# Patient Record
Sex: MALE | Race: ASIAN | Employment: STUDENT | URBAN - METROPOLITAN AREA
[De-identification: names, ages, dates, MRNs, and addresses within clinical notes are randomized per-mention and may not be internally consistent; named-entity substitution may affect disease eponyms.]

---

## 2020-01-28 ENCOUNTER — HOSPITAL ENCOUNTER (EMERGENCY)
Facility: CLINIC | Age: 20
Discharge: HOME | End: 2020-01-28
Attending: EMERGENCY MEDICINE | Admitting: EMERGENCY MEDICINE
Payer: COMMERCIAL

## 2020-01-28 ENCOUNTER — APPOINTMENT (OUTPATIENT)
Dept: GENERAL RADIOLOGY | Facility: CLINIC | Age: 20
End: 2020-01-28
Attending: EMERGENCY MEDICINE
Payer: COMMERCIAL

## 2020-01-28 VITALS
WEIGHT: 165.34 LBS | OXYGEN SATURATION: 100 % | SYSTOLIC BLOOD PRESSURE: 125 MMHG | RESPIRATION RATE: 25 BRPM | BODY MASS INDEX: 23.67 KG/M2 | DIASTOLIC BLOOD PRESSURE: 77 MMHG | HEIGHT: 70 IN | HEART RATE: 74 BPM | TEMPERATURE: 99.7 F

## 2020-01-28 DIAGNOSIS — J06.9 VIRAL UPPER RESPIRATORY TRACT INFECTION: ICD-10-CM

## 2020-01-28 LAB
ALBUMIN SERPL-MCNC: 4.7 G/DL (ref 3.4–5)
ALBUMIN UR-MCNC: NEGATIVE MG/DL
ALP SERPL-CCNC: 69 U/L (ref 40–150)
ALT SERPL W P-5'-P-CCNC: 15 U/L (ref 0–70)
ANION GAP SERPL CALCULATED.3IONS-SCNC: 3 MMOL/L (ref 3–14)
APPEARANCE UR: CLEAR
AST SERPL W P-5'-P-CCNC: 7 U/L (ref 0–45)
BASOPHILS # BLD AUTO: 0 10E9/L (ref 0–0.2)
BASOPHILS NFR BLD AUTO: 0.5 %
BILIRUB SERPL-MCNC: 1.3 MG/DL (ref 0.2–1.3)
BILIRUB UR QL STRIP: NEGATIVE
BUN SERPL-MCNC: 8 MG/DL (ref 7–30)
C PNEUM DNA SPEC QL NAA+PROBE: NOT DETECTED
CALCIUM SERPL-MCNC: 9 MG/DL (ref 8.5–10.1)
CHLORIDE SERPL-SCNC: 106 MMOL/L (ref 94–109)
CO2 SERPL-SCNC: 28 MMOL/L (ref 20–32)
COLOR UR AUTO: YELLOW
CREAT SERPL-MCNC: 0.7 MG/DL (ref 0.66–1.25)
DIFFERENTIAL METHOD BLD: NORMAL
EOSINOPHIL # BLD AUTO: 0 10E9/L (ref 0–0.7)
EOSINOPHIL NFR BLD AUTO: 0.1 %
ERYTHROCYTE [DISTWIDTH] IN BLOOD BY AUTOMATED COUNT: 11.9 % (ref 10–15)
FLUAV H1 2009 PAND RNA SPEC QL NAA+PROBE: NOT DETECTED
FLUAV H1 RNA SPEC QL NAA+PROBE: NOT DETECTED
FLUAV H3 RNA SPEC QL NAA+PROBE: NOT DETECTED
FLUAV RNA SPEC QL NAA+PROBE: NOT DETECTED
FLUAV+FLUBV AG SPEC QL: NEGATIVE
FLUAV+FLUBV AG SPEC QL: NEGATIVE
FLUBV RNA SPEC QL NAA+PROBE: NOT DETECTED
GFR SERPL CREATININE-BSD FRML MDRD: >90 ML/MIN/{1.73_M2}
GLUCOSE SERPL-MCNC: 101 MG/DL (ref 70–99)
GLUCOSE UR STRIP-MCNC: NEGATIVE MG/DL
HADV DNA SPEC QL NAA+PROBE: NOT DETECTED
HCOV PNL SPEC NAA+PROBE: NOT DETECTED
HCT VFR BLD AUTO: 49.2 % (ref 40–53)
HGB BLD-MCNC: 17.2 G/DL (ref 13.3–17.7)
HGB UR QL STRIP: NEGATIVE
HMPV RNA SPEC QL NAA+PROBE: NOT DETECTED
HPIV1 RNA SPEC QL NAA+PROBE: NOT DETECTED
HPIV2 RNA SPEC QL NAA+PROBE: NOT DETECTED
HPIV3 RNA SPEC QL NAA+PROBE: NOT DETECTED
HPIV4 RNA SPEC QL NAA+PROBE: NOT DETECTED
IMM GRANULOCYTES # BLD: 0 10E9/L (ref 0–0.4)
IMM GRANULOCYTES NFR BLD: 0.4 %
KETONES UR STRIP-MCNC: 80 MG/DL
LACTATE BLD-SCNC: 1 MMOL/L (ref 0.7–2)
LEUKOCYTE ESTERASE UR QL STRIP: NEGATIVE
LYMPHOCYTES # BLD AUTO: 1.7 10E9/L (ref 0.8–5.3)
LYMPHOCYTES NFR BLD AUTO: 20.8 %
M PNEUMO DNA SPEC QL NAA+PROBE: NOT DETECTED
MCH RBC QN AUTO: 29.8 PG (ref 26.5–33)
MCHC RBC AUTO-ENTMCNC: 35 G/DL (ref 31.5–36.5)
MCV RBC AUTO: 85 FL (ref 78–100)
MICROBIOLOGIST REVIEW: NORMAL
MONOCYTES # BLD AUTO: 0.8 10E9/L (ref 0–1.3)
MONOCYTES NFR BLD AUTO: 10.1 %
NEUTROPHILS # BLD AUTO: 5.6 10E9/L (ref 1.6–8.3)
NEUTROPHILS NFR BLD AUTO: 68.1 %
NITRATE UR QL: NEGATIVE
NRBC # BLD AUTO: 0 10*3/UL
NRBC BLD AUTO-RTO: 0 /100
PH UR STRIP: 6 PH (ref 5–7)
PLATELET # BLD AUTO: 307 10E9/L (ref 150–450)
POTASSIUM SERPL-SCNC: 3.5 MMOL/L (ref 3.4–5.3)
PROT SERPL-MCNC: 8.2 G/DL (ref 6.8–8.8)
RBC # BLD AUTO: 5.78 10E12/L (ref 4.4–5.9)
RSV AG SPEC QL: NEGATIVE
RSV RNA SPEC QL NAA+PROBE: NOT DETECTED
RSV RNA SPEC QL NAA+PROBE: NOT DETECTED
RV+EV RNA SPEC QL NAA+PROBE: NOT DETECTED
SODIUM SERPL-SCNC: 138 MMOL/L (ref 133–144)
SOURCE: ABNORMAL
SP GR UR STRIP: 1.02 (ref 1–1.03)
SPECIMEN SOURCE: NORMAL
SPECIMEN SOURCE: NORMAL
UROBILINOGEN UR STRIP-MCNC: NORMAL MG/DL (ref 0–2)
WBC # BLD AUTO: 8.2 10E9/L (ref 4–11)

## 2020-01-28 PROCEDURE — 87633 RESP VIRUS 12-25 TARGETS: CPT | Performed by: EMERGENCY MEDICINE

## 2020-01-28 PROCEDURE — 85025 COMPLETE CBC W/AUTO DIFF WBC: CPT | Performed by: EMERGENCY MEDICINE

## 2020-01-28 PROCEDURE — 80053 COMPREHEN METABOLIC PANEL: CPT | Performed by: EMERGENCY MEDICINE

## 2020-01-28 PROCEDURE — 87807 RSV ASSAY W/OPTIC: CPT | Performed by: EMERGENCY MEDICINE

## 2020-01-28 PROCEDURE — 99284 EMERGENCY DEPT VISIT MOD MDM: CPT | Mod: Z6 | Performed by: EMERGENCY MEDICINE

## 2020-01-28 PROCEDURE — 25800030 ZZH RX IP 258 OP 636: Performed by: EMERGENCY MEDICINE

## 2020-01-28 PROCEDURE — 87252 VIRUS INOCULATION TISSUE: CPT | Performed by: EMERGENCY MEDICINE

## 2020-01-28 PROCEDURE — 71046 X-RAY EXAM CHEST 2 VIEWS: CPT

## 2020-01-28 PROCEDURE — 96360 HYDRATION IV INFUSION INIT: CPT | Performed by: EMERGENCY MEDICINE

## 2020-01-28 PROCEDURE — 81003 URINALYSIS AUTO W/O SCOPE: CPT | Performed by: EMERGENCY MEDICINE

## 2020-01-28 PROCEDURE — 25000132 ZZH RX MED GY IP 250 OP 250 PS 637: Performed by: EMERGENCY MEDICINE

## 2020-01-28 PROCEDURE — 87486 CHLMYD PNEUM DNA AMP PROBE: CPT | Performed by: EMERGENCY MEDICINE

## 2020-01-28 PROCEDURE — 83605 ASSAY OF LACTIC ACID: CPT | Performed by: EMERGENCY MEDICINE

## 2020-01-28 PROCEDURE — 99284 EMERGENCY DEPT VISIT MOD MDM: CPT | Mod: 25 | Performed by: EMERGENCY MEDICINE

## 2020-01-28 PROCEDURE — 87581 M.PNEUMON DNA AMP PROBE: CPT | Performed by: EMERGENCY MEDICINE

## 2020-01-28 PROCEDURE — 87804 INFLUENZA ASSAY W/OPTIC: CPT | Performed by: EMERGENCY MEDICINE

## 2020-01-28 RX ORDER — BENZONATATE 100 MG/1
100 CAPSULE ORAL ONCE
Status: COMPLETED | OUTPATIENT
Start: 2020-01-28 | End: 2020-01-28

## 2020-01-28 RX ORDER — ACETAMINOPHEN 500 MG
1000 TABLET ORAL ONCE
Status: COMPLETED | OUTPATIENT
Start: 2020-01-28 | End: 2020-01-28

## 2020-01-28 RX ADMIN — BENZONATATE 100 MG: 100 CAPSULE ORAL at 15:11

## 2020-01-28 RX ADMIN — SODIUM CHLORIDE 2000 ML: 9 INJECTION, SOLUTION INTRAVENOUS at 11:17

## 2020-01-28 RX ADMIN — ACETAMINOPHEN 1000 MG: 500 TABLET, FILM COATED ORAL at 11:24

## 2020-01-28 SDOH — HEALTH STABILITY: MENTAL HEALTH: HOW OFTEN DO YOU HAVE A DRINK CONTAINING ALCOHOL?: NEVER

## 2020-01-28 ASSESSMENT — ENCOUNTER SYMPTOMS
DIFFICULTY URINATING: 0
HEADACHES: 0
EYE REDNESS: 0
APPETITE CHANGE: 1
COLOR CHANGE: 0
COUGH: 1
FATIGUE: 1
NECK STIFFNESS: 0
CONFUSION: 0
SHORTNESS OF BREATH: 0
ABDOMINAL PAIN: 0
FEVER: 1
ARTHRALGIAS: 0

## 2020-01-28 ASSESSMENT — MIFFLIN-ST. JEOR: SCORE: 1762.5

## 2020-01-28 NOTE — ED PROVIDER NOTES
[place] EMERGENCY DEPARTMENT ([facility])  1/28/20 ED 31  10:28 AM   History     Chief Complaint   Patient presents with     Flu Symptoms     The history is provided by the patient and medical records.     Boy Carnes is a 19 year old male who presents with subjective fevers, chills, nausea and nonproductive cough that started approximately 3-4 days ago, though isn't certain exactly when symptoms began. Patient did have recent travel to [place] for approximately 20 days over winter break. He was in [facility] earlier this month. He notes that he was in open public spaces in [facility] for over a day, but not over two days. He denies being in the markets at [facility].  He left [facility] on 1/11/2020, stayed 1 day in [facility], and spent 2 days in a hotel room with its own airflow system in self-imposed quarantine. He then returned to [place] on 1/18/2020. Patient states that 3-4 days ago he developed subjective fevers. He went to [facility] on 1/25/2020 for evaluation given low-grade temperatures, travel to [facility] and concern for exposure to coronavirus.  Care Everywhere records reviewed, patient providers at [facility] did contact [facility] who noted that he was at the end of incubation.  No coronavirus swabs were done at that time. He was given a medication for fever (acetaminophen). He states he was tested for influenza and this was negative.  He also had a chest x-ray which was negative.  He was given diagnosis of anxiety, was discharged home with strict return precautions.  Patient subsequently developed a mild dry cough yesterday along with midsternal chest discomfort and so presents to the ER for evaluation.  He has poor appetite and has not been eating much. He has generalized weakness, but thinks this is from the decreased food intake. He denies any particular sick contacts. He has midsternal chest discomfort but denies any distinct chest pain. He is concerned by this midsternal chest discomfort. He has been  "sleeping ok. Patient had temperature of 99.4 F today, no high fevers. He has sore throat, nonproductive cough, congestion.  He has generalized weakness and poor appetite. No diarrhea. No shortness of breath or chest pain.  He states that the acetaminophen helped his subjective fevers temporarily but subjective fever recurred soon after this medication wore off. He has not tried any other medications since then.     I have reviewed the Medications, Allergies, Past Medical and Surgical History, and Social History in the [facility] system.  History reviewed. No pertinent past medical history.    History reviewed. No pertinent surgical history.    History reviewed. No pertinent family history.    Social History     Tobacco Use     Smoking status: Never Smoker     Smokeless tobacco: Never Used   Substance Use Topics     Alcohol use: Never     Frequency: Never   Social history: Patient is a student at the [employer] with 3 roommates.  He has been a student for the past 6 months in [place].  Review of Systems   Constitutional: Positive for appetite change, fatigue and fever ( Subjective, no temperatures recorded above 99.7).   HENT: Negative for congestion.    Eyes: Negative for redness.   Respiratory: Positive for cough. Negative for shortness of breath.    Cardiovascular: Chest pain: Midsternal chest discomfort.   Gastrointestinal: Negative for abdominal pain.   Genitourinary: Negative for difficulty urinating.   Musculoskeletal: Negative for arthralgias and neck stiffness.   Skin: Negative for color change.   Neurological: Negative for headaches.   Psychiatric/Behavioral: Negative for confusion.       Physical Exam   BP: 132/86  Pulse: 116  Heart Rate: 73  Temp: 99.7  F (37.6  C)  Resp: 18  Height: 178 cm (5' 10.08\")  Weight: 75 kg (165 lb 5.5 oz)  SpO2: 97 %      Physical Exam  Constitutional:       General: He is not in acute distress.     Appearance: He is not diaphoretic.   HENT:      Head: Atraumatic. "   Eyes:      General: No scleral icterus.     Pupils: Pupils are equal, round, and reactive to light.   Cardiovascular:      Heart sounds: Normal heart sounds.   Pulmonary:      Effort: No respiratory distress.      Breath sounds: Normal breath sounds.   Abdominal:      General: Bowel sounds are normal.      Palpations: Abdomen is soft.      Tenderness: There is no abdominal tenderness.   Musculoskeletal:         General: No tenderness.   Skin:     General: Skin is warm.      Findings: No rash.   Psychiatric:         Mood and Affect: Mood is anxious.         ED Course        Procedures  Results for orders placed or performed during the hospital encounter of 01/28/20 (from the past 24 hour(s))   CBC with platelets differential   Result Value Ref Range    WBC 8.2 4.0 - 11.0 10e9/L    RBC Count 5.78 4.4 - 5.9 10e12/L    Hemoglobin 17.2 13.3 - 17.7 g/dL    Hematocrit 49.2 40.0 - 53.0 %    MCV 85 78 - 100 fl    MCH 29.8 26.5 - 33.0 pg    MCHC 35.0 31.5 - 36.5 g/dL    RDW 11.9 10.0 - 15.0 %    Platelet Count 307 150 - 450 10e9/L    Diff Method Automated Method     % Neutrophils 68.1 %    % Lymphocytes 20.8 %    % Monocytes 10.1 %    % Eosinophils 0.1 %    % Basophils 0.5 %    % Immature Granulocytes 0.4 %    Nucleated RBCs 0 0 /100    Absolute Neutrophil 5.6 1.6 - 8.3 10e9/L    Absolute Lymphocytes 1.7 0.8 - 5.3 10e9/L    Absolute Monocytes 0.8 0.0 - 1.3 10e9/L    Absolute Eosinophils 0.0 0.0 - 0.7 10e9/L    Absolute Basophils 0.0 0.0 - 0.2 10e9/L    Abs Immature Granulocytes 0.0 0 - 0.4 10e9/L    Absolute Nucleated RBC 0.0    Comprehensive metabolic panel   Result Value Ref Range    Sodium 138 133 - 144 mmol/L    Potassium 3.5 3.4 - 5.3 mmol/L    Chloride 106 94 - 109 mmol/L    Carbon Dioxide 28 20 - 32 mmol/L    Anion Gap 3 3 - 14 mmol/L    Glucose 101 (H) 70 - 99 mg/dL    Urea Nitrogen 8 7 - 30 mg/dL    Creatinine 0.70 0.66 - 1.25 mg/dL    GFR Estimate >90 >60 mL/min/[1.73_m2]    GFR Estimate If Black >90 >60  mL/min/[1.73_m2]    Calcium 9.0 8.5 - 10.1 mg/dL    Bilirubin Total 1.3 0.2 - 1.3 mg/dL    Albumin 4.7 3.4 - 5.0 g/dL    Protein Total 8.2 6.8 - 8.8 g/dL    Alkaline Phosphatase 69 40 - 150 U/L    ALT 15 0 - 70 U/L    AST 7 0 - 45 U/L   Influenza A/B antigen   Result Value Ref Range    Influenza A/B Agn Specimen Nasopharyngeal     Influenza A Negative NEG^Negative    Influenza B Negative NEG^Negative   Lactic acid whole blood   Result Value Ref Range    Lactic Acid 1.0 0.7 - 2.0 mmol/L   RSV rapid antigen   Result Value Ref Range    RSV Rapid Antigen Spec Type Nasopharyngeal     RSV Rapid Antigen Result Negative NEG^Negative   XR Chest 2 Views    Narrative    EXAMINATION: XR CHEST 2 VW, 1/28/2020 12:17 PM    INDICATION: Cough, shortness of breath    COMPARISON: None    FINDINGS: PA and lateral upright views of the chest.     Trachea is midline. Cardiac mediastinal borders are clear. No focal  airspace opacity. No pneumothorax. No pleural effusion. Soft tissues  are grossly unremarkable. No acute osseous abnormalities.       Impression    IMPRESSION: No acute cardiac or pulmonary abnormalities.     Medications   benzonatate (TESSALON) capsule 100 mg (has no administration in time range)   0.9% sodium chloride BOLUS (0 mLs Intravenous Stopped 1/28/20 1217)   acetaminophen (TYLENOL) tablet 1,000 mg (1,000 mg Oral Given 1/28/20 1124)         Assessments & Plan (with Medical Decision Making)   19-year-old male who presents with 3 to 4-day history of cough, low-grade temperatures, mild nausea, decreased appetite.  Differential includes URI of viral etiology, pneumonia, medication side effect, other infection.  Exam revealed that he was slightly tachycardic and somewhat anxious.  Chest x-ray was negative.  CBC and comprehensive metabolic panel were normal.  Influenza and RSV swabs were negative.  The case was discussed at length with [place] [facility] as well as infection control given relatively recent visit  to wound on [place] and concern for coronavirus.  Respiratory viral panel by PCR was sent and is pending.  [facility] had been contacted by [facility] and recommended testing for coronavirus.  2 oral pharyngeal swabs and one nasopharyngeal swab were obtained and sent.  Ultimately, after contacting infection control and MD regarding patient's living situation, it was felt that patient could maintain quarantine in his current living place.  Patient was instructed to return for elevated temperatures above 101.5, other concerning symptoms.    I have reviewed the nursing notes.    I have reviewed the findings, diagnosis, plan and need for follow up with the patient.    New Prescriptions    No medications on file       Final diagnoses:   Viral upper respiratory tract infection   IPamela, am serving as a trained medical scribe to document services personally performed by Yoni Willis MD based on the provider's statements to me on January 28, 2020.  This document has been checked and approved by the attending provider.    IYoni MD, was physically present and have reviewed and verified the accuracy of this note documented by Pamela Miguel, medical scribe.       1/28/2020   [facility], [place], EMERGENCY DEPARTMENT     Yoni Willis MD  01/28/20 4603

## 2020-01-28 NOTE — ED AVS SNAPSHOT
[facility], [place], Emergency Department  [address] [id number]  Phone:  [phone]                                    Boy Carnes   MRN: [id number]    Department:  [facility], Emergency Department   Date of Visit:  1/28/2020           After Visit Summary Signature Page    I have received my discharge instructions, and my questions have been answered. I have discussed any challenges I see with this plan with the nurse or doctor.    ..........................................................................................................................................  Patient/Patient Representative Signature      ..........................................................................................................................................  Patient Representative Print Name and Relationship to Patient    ..................................................               ................................................  Date                                   Time    ..........................................................................................................................................  Reviewed by Signature/Title    ...................................................              ..............................................  Date                                               Time          22EPIC Rev 08/18

## 2020-01-28 NOTE — ED TRIAGE NOTES
Pt presents to ED with c/o cough, fever, and weakness. Symptoms started about 4 days ago. Was staying in [facility] for 1-2 days, left [facility] 17 days ago and stayed in a different city in [place]. [place] the [place] about 10 [place] ago.

## 2020-02-05 LAB
LAB SCANNED RESULT: NORMAL
LAB SCANNED RESULT: NORMAL

## 2020-02-10 LAB
SPECIMEN SOURCE: NORMAL
VIRUS SPEC CULT: NORMAL
VIRUS SPEC CULT: NORMAL

## 2020-02-25 ENCOUNTER — TRANSFERRED RECORDS (OUTPATIENT)
Dept: HEALTH INFORMATION MANAGEMENT | Facility: CLINIC | Age: 20
End: 2020-02-25

## 2020-02-27 ENCOUNTER — OFFICE VISIT (OUTPATIENT)
Dept: OPHTHALMOLOGY | Facility: CLINIC | Age: 20
End: 2020-02-27
Attending: OPHTHALMOLOGY
Payer: COMMERCIAL

## 2020-02-27 DIAGNOSIS — H33.321 ROUND HOLE OF RIGHT RETINA WITHOUT DETACHMENT: ICD-10-CM

## 2020-02-27 DIAGNOSIS — H52.13 MYOPIA, BILATERAL: Primary | ICD-10-CM

## 2020-02-27 DIAGNOSIS — H35.413 BILATERAL RETINAL LATTICE DEGENERATION: ICD-10-CM

## 2020-02-27 PROCEDURE — G0463 HOSPITAL OUTPT CLINIC VISIT: HCPCS | Mod: ZF

## 2020-02-27 PROCEDURE — 67145 PROPH RTA DTCHMNT PC: CPT | Mod: RT,ZF | Performed by: OPHTHALMOLOGY

## 2020-02-27 PROCEDURE — 92250 FUNDUS PHOTOGRAPHY W/I&R: CPT | Mod: ZF | Performed by: OPHTHALMOLOGY

## 2020-02-27 ASSESSMENT — VISUAL ACUITY
OD_CC: 20/60
METHOD: SNELLEN - LINEAR
OS_CC: 20/40
OS_PH_CC: 20/25
CORRECTION_TYPE: GLASSES
OS_PH_CC+: +2
OD_PH_CC: 20/30

## 2020-02-27 ASSESSMENT — TONOMETRY
IOP_METHOD: TONOPEN
OD_IOP_MMHG: 19
OS_IOP_MMHG: 17

## 2020-02-27 ASSESSMENT — CONF VISUAL FIELD
OD_NORMAL: 1
OS_NORMAL: 1
METHOD: COUNTING FINGERS

## 2020-02-27 ASSESSMENT — REFRACTION_WEARINGRX
OS_SPHERE: --7.75
OS_CYLINDER: +0.75
OS_SPHERE: -7.00
OS_AXIS: 052
OD_CYLINDER: +1.00
OD_CYLINDER: +1.00
OD_SPHERE: -9.25
OD_AXIS: 087
OD_SPHERE: -8.50
OS_CYLINDER: +0.75
OS_AXIS: 054
OD_AXIS: 087

## 2020-02-27 ASSESSMENT — EXTERNAL EXAM - RIGHT EYE: OD_EXAM: NORMAL

## 2020-02-27 ASSESSMENT — SLIT LAMP EXAM - LIDS
COMMENTS: NORMAL
COMMENTS: NORMAL

## 2020-02-27 ASSESSMENT — EXTERNAL EXAM - LEFT EYE: OS_EXAM: NORMAL

## 2020-02-27 NOTE — PROGRESS NOTES
I have confirmed the patient's and reviewed Past Medical History, Past Surgical History, Social History, Family History, Problem List, Medication List and agree with Tech note.    CC: consult Dr. Parekh     HPI: patient has high myopia and was found to have peripheral retina changes on dilated fundus exam     Assessment/plan:   1.  Lattice Degeneration both eyes    - OPTOS photo today shows large atrophic holes right eye with subclinial RD right eye; lattice deg left eye with no holes   - retinopexy right eye today    - Retina detachment precautions were discussed with the patient (presence or increased in flashes, floaters or a curtain in the visual field) and was asked to return if any of the those occur   - RTC in 1-2 week      2.  High myopia both eyes    - keep prescription       RTC 2-4 weeks for dilated fundus exam     Jey Miller MD  Vitreoretinal surgery fellow  [facility]    Attestation:  I have seen and examined the patient with Dr. Miller and agree with the findings in this note, as well as the interpretations of the diagnostic tests. I was present for procedure.       Dwayne Bentley MD PhD.  Professor & Chair

## 2020-02-27 NOTE — NURSING NOTE
Chief Complaints and History of Present Illnesses   Patient presents with     New Patient     Chief Complaint(s) and History of Present Illness(es)     New Patient     Laterality: both eyes    Associated symptoms: floaters.  Negative for flashes    Pain scale: 0/10              Comments     New patient presents for a retinal evaluation for his right eye.   The patient notes that he went for a routine eye exam and the doctor noted a problem on his right eye retina.  He notes left eye floaters.  He notes he has stable vision.    Ora Pablo, COA, COA 8:59 AM 02/27/2020

## 2020-03-02 ENCOUNTER — OFFICE VISIT (OUTPATIENT)
Dept: OPHTHALMOLOGY | Facility: CLINIC | Age: 20
End: 2020-03-02
Attending: OPHTHALMOLOGY
Payer: COMMERCIAL

## 2020-03-02 DIAGNOSIS — H35.413 BILATERAL RETINAL LATTICE DEGENERATION: ICD-10-CM

## 2020-03-02 DIAGNOSIS — H33.321 ROUND HOLE OF RIGHT RETINA WITHOUT DETACHMENT: ICD-10-CM

## 2020-03-02 DIAGNOSIS — H52.13 MYOPIA, BILATERAL: Primary | ICD-10-CM

## 2020-03-02 PROCEDURE — G0463 HOSPITAL OUTPT CLINIC VISIT: HCPCS | Mod: ZF

## 2020-03-02 RX ORDER — MULTIPLE VITAMINS W/ MINERALS TAB 9MG-400MCG
1 TAB ORAL DAILY
COMMUNITY

## 2020-03-02 ASSESSMENT — VISUAL ACUITY
OS_CC: 20/30
OD_CC: 20/60
OS_PH_CC: 20/20
METHOD: SNELLEN - LINEAR
OD_PH_CC+: -1
OD_PH_CC: 20/25
OS_PH_CC+: -3

## 2020-03-02 ASSESSMENT — REFRACTION_WEARINGRX
OS_SPHERE: --7.75
OS_SPHERE: -7.00
OS_CYLINDER: +0.75
OS_AXIS: 052
OD_AXIS: 087
OD_AXIS: 087
OD_CYLINDER: +1.00
OD_SPHERE: -9.25
OD_CYLINDER: +1.00
OS_AXIS: 054
OS_CYLINDER: +0.75
OD_SPHERE: -8.50

## 2020-03-02 ASSESSMENT — CONF VISUAL FIELD
METHOD: COUNTING FINGERS
OD_NORMAL: 1
OS_NORMAL: 1

## 2020-03-02 ASSESSMENT — EXTERNAL EXAM - LEFT EYE: OS_EXAM: NORMAL

## 2020-03-02 ASSESSMENT — EXTERNAL EXAM - RIGHT EYE: OD_EXAM: NORMAL

## 2020-03-02 ASSESSMENT — TONOMETRY
OD_IOP_MMHG: 17
OS_IOP_MMHG: 22
IOP_METHOD: TONOPEN

## 2020-03-02 ASSESSMENT — SLIT LAMP EXAM - LIDS
COMMENTS: NORMAL
COMMENTS: NORMAL

## 2020-03-02 NOTE — PROGRESS NOTES
I have confirmed the patient's and reviewed Past Medical History, Past Surgical History, Social History, Family History, Problem List, Medication List and agree with Tech note.    CC: large pupil OD    HPI: patient has high myopia and was found to have peripheral retina changes on dilated fundus exam  Has laser right eye and since than pupil has remained larger      Interval history: Patient had retinopexy right eye 2/27/20, since then has noticed that his right pupil is still dilated. Vision stable. No flashes or new floaters. Left eye normal.     Assessment/plan:   1.  Lattice Degeneration both eyes    - OPTOS photo today shows large atrophic holes right eye with subclinial RD right eye; lattice deg left eye with no holes   - retinopexy right eye 2/27/20   - Retina detachment precautions were discussed with the patient (presence or increased in flashes, floaters or a curtain in the visual field) and was asked to return if any of the those occur   - pupil is responding to light      - RTC keep appointment       2.  High myopia both eyes    - keep prescription       RTC 2 weeks for dilated fundus exam as scheduled    Chelita Torres MD  Ophthalmology Resident, PGY-3    ATTESTATION:  I have seen and examined the patient with Dr. Torres and agree with the findings in this note,as well as the interpretations of the diagnostic tests.    Dwayne Bentley MD PhD.  Professor & Chair

## 2020-03-02 NOTE — NURSING NOTE
Chief Complaints and History of Present Illnesses   Patient presents with     Follow Up     Chief Complaint(s) and History of Present Illness(es)     Follow Up     Associated symptoms: Negative for eye pain    Pain scale: 0/10              Comments     Babatunde is here for a follow up of  Lattice Degeneration both eyes. History of High Myopia, both eyes. . He feels RE is more blurred since the laser treatment  last visit. He also states R pupil is larger than L. He also states RE has tired easily since laser treatment.     Srikanth Berry COT 12:13 PM March 2, 2020

## 2020-03-11 ENCOUNTER — OFFICE VISIT (OUTPATIENT)
Dept: OPHTHALMOLOGY | Facility: CLINIC | Age: 20
End: 2020-03-11
Attending: OPHTHALMOLOGY
Payer: COMMERCIAL

## 2020-03-11 DIAGNOSIS — H33.321 ROUND HOLE OF RIGHT RETINA WITHOUT DETACHMENT: Primary | ICD-10-CM

## 2020-03-11 DIAGNOSIS — H35.413 BILATERAL RETINAL LATTICE DEGENERATION: ICD-10-CM

## 2020-03-11 DIAGNOSIS — H52.13 MYOPIA, BILATERAL: ICD-10-CM

## 2020-03-11 PROCEDURE — 92250 FUNDUS PHOTOGRAPHY W/I&R: CPT | Mod: ZF | Performed by: OPHTHALMOLOGY

## 2020-03-11 PROCEDURE — G0463 HOSPITAL OUTPT CLINIC VISIT: HCPCS | Mod: ZF

## 2020-03-11 ASSESSMENT — TONOMETRY
IOP_METHOD: TONOPEN
OD_IOP_MMHG: 18
OS_IOP_MMHG: 20

## 2020-03-11 ASSESSMENT — VISUAL ACUITY
OS_CC: 20/25
METHOD: SNELLEN - LINEAR
CORRECTION_TYPE: GLASSES
OD_CC: 20/40
OS_CC+: -2
OD_PH_CC: 20/20

## 2020-03-11 ASSESSMENT — EXTERNAL EXAM - LEFT EYE: OS_EXAM: NORMAL

## 2020-03-11 ASSESSMENT — SLIT LAMP EXAM - LIDS
COMMENTS: NORMAL
COMMENTS: NORMAL

## 2020-03-11 ASSESSMENT — EXTERNAL EXAM - RIGHT EYE: OD_EXAM: NORMAL

## 2020-03-11 NOTE — NURSING NOTE
Chief Complaints and History of Present Illnesses   Patient presents with     Post Op (Ophthalmology) Right Eye     Chief Complaint(s) and History of Present Illness(es)     Post Op (Ophthalmology) Right Eye     Laterality: right eye    Frequency: constantly    Timing: throughout the day    Course: stable    Associated symptoms: Negative for eye pain, flashes, floaters and discharge    Treatments tried: artificial tears    Pain scale: 0/10              Comments     retinopexy right eye 2/27/20  One week return / eye feels 'tired', denies pain, flashes, floaters, discomfort, per pt. States VA unchanged from last visit.  No eye meds / just use of art tears almost every day per pt.  JANE Craig COT 9:06 AM 03/11/2020

## 2020-03-11 NOTE — PROGRESS NOTES
I have confirmed the patient's and reviewed Past Medical History, Past Surgical History, Social History, Family History, Problem List, Medication List and agree with Tech note.    CC: large pupil OD    HPI: patient has high myopia and was found to have peripheral retina changes on dilated fundus exam  Has laser right eye and since than pupil has remained larger      Interval history: Patient had retinopexy right eye 2/27/20, he came back on 3/2/2020 with dilated pupil but pupil is now back to normal size. Vision stable. No flashes or new floaters. Left eye normal.     Assessment/plan:   1.  Lattice Degeneration both eyes    - OPTOS photo today shows large atrophic holes right eye with subclinial RD right eye; lattice deg left eye with no holes   - retinopexy right eye 2/27/20   - Retina detachment precautions were discussed with the patient (presence or increased in flashes, floaters or a curtain in the visual field) and was asked to return if any of the those occur   - pupil is reactive and back to normal size    - RTC 4 weeks for repeat DFE    2.  High myopia both eyes    - keep prescription       RTC 4 weeks; see MD first before dilating the eyes.     Jey Miller MD  Vitreoretinal surgery fellow  [facility]      ATTESTATION:  I have seen and examined the patient with Dr. Miller and agree with the findings in this note,as well as the interpretations of the diagnostic tests.    Dwayne Bentley MD PhD.  Professor & Chair

## 2021-01-04 ENCOUNTER — HEALTH MAINTENANCE LETTER (OUTPATIENT)
Age: 21
End: 2021-01-04

## 2021-10-10 ENCOUNTER — HEALTH MAINTENANCE LETTER (OUTPATIENT)
Age: 21
End: 2021-10-10

## 2021-11-12 ENCOUNTER — OFFICE VISIT (OUTPATIENT)
Dept: OPHTHALMOLOGY | Facility: CLINIC | Age: 21
End: 2021-11-12
Attending: OPTOMETRIST
Payer: COMMERCIAL

## 2021-11-12 DIAGNOSIS — H04.123 DRY EYES, BILATERAL: Primary | ICD-10-CM

## 2021-11-12 DIAGNOSIS — H35.413 BILATERAL RETINAL LATTICE DEGENERATION: ICD-10-CM

## 2021-11-12 PROCEDURE — 92004 COMPRE OPH EXAM NEW PT 1/>: CPT | Performed by: OPTOMETRIST

## 2021-11-12 PROCEDURE — G0463 HOSPITAL OUTPT CLINIC VISIT: HCPCS

## 2021-11-12 ASSESSMENT — SLIT LAMP EXAM - LIDS
COMMENTS: NORMAL
COMMENTS: 1+ BLEPHARITIS, 1+ MEIBOMIAN GLAND DYSFUNCTION

## 2021-11-12 ASSESSMENT — REFRACTION_WEARINGRX
OD_AXIS: 085
OD_SPHERE: -8.75
OS_AXIS: 055
OD_CYLINDER: +1.00
OS_SPHERE: -7.50
SPECS_TYPE: SVL
OS_CYLINDER: +0.50

## 2021-11-12 ASSESSMENT — CONF VISUAL FIELD
OD_NORMAL: 1
METHOD: COUNTING FINGERS
OS_NORMAL: 1

## 2021-11-12 ASSESSMENT — EXTERNAL EXAM - LEFT EYE: OS_EXAM: NORMAL

## 2021-11-12 ASSESSMENT — VISUAL ACUITY
OS_CC: 20/25-2
METHOD: SNELLEN - LINEAR
OD_CC: 20/20-3

## 2021-11-12 ASSESSMENT — TONOMETRY
IOP_METHOD: ICARE
OS_IOP_MMHG: 15
OD_IOP_MMHG: 16

## 2021-11-12 ASSESSMENT — EXTERNAL EXAM - RIGHT EYE: OD_EXAM: NORMAL

## 2021-11-12 NOTE — PATIENT INSTRUCTIONS
Warm compresses with lid hygiene   Refresh Drops for dryness   Retina unchanged   6 mth DFE and photos retinal clinic   Discussed vitreoul floaters and PVD   Educated patient on signs and symptoms of retinal detachment including increase in flashes, floaters, or a change in vision. If symptoms present, return to clinic immediately.

## 2021-11-12 NOTE — PROGRESS NOTES
Assessment & Plan       Boy Carnes is a 21 year old male with the following diagnoses:   1. Dry eyes, bilateral    2. Bilateral retinal lattice degeneration         Warm compresses with lid hygiene   Refresh Drops for dryness   Retina unchanged   6 mth DFE and photos retinal clinic   Discussed vitreoul floaters and PVD   Educated patient on signs and symptoms of retinal detachment including increase in flashes, floaters, or a change in vision. If symptoms present, return to clinic immediately.       Patient disposition:   Return in about 6 months (around 5/12/2022) for 6 mthsVisit DFE, Follow Up Retinal clinic.         Assessment & Plan       Boy Carnes is a 21 year old male with the following diagnoses:   1. Dry eyes, bilateral    2. Bilateral retinal lattice degeneration              Patient disposition:   Return in about 6 months (around 5/12/2022) for 6 mthsVisit DFE, Follow Up Retinal clinic.          Complete documentation of historical and exam elements from today's encounter can be found in the full encounter summary report (not reduplicated in this progress note). I personally obtained the chief complaint(s) and history of present illness.  I confirmed and edited as necessary the review of systems, past medical/surgical history, family history, social history, and examination findings as documented by others; and I examined the patient myself. I personally reviewed the relevant tests, images, and reports as documented above. I formulated and edited as necessary the assessment and plan and discussed the findings and management plan with the patient and family.  Dr. Bennie Clayton, OD

## 2022-01-30 ENCOUNTER — HEALTH MAINTENANCE LETTER (OUTPATIENT)
Age: 22
End: 2022-01-30

## 2022-04-06 DIAGNOSIS — H35.413 BILATERAL RETINAL LATTICE DEGENERATION: Primary | ICD-10-CM

## 2022-04-07 ENCOUNTER — TELEPHONE (OUTPATIENT)
Dept: OPHTHALMOLOGY | Facility: CLINIC | Age: 22
End: 2022-04-07
Payer: COMMERCIAL

## 2022-04-22 ENCOUNTER — OFFICE VISIT (OUTPATIENT)
Dept: OPHTHALMOLOGY | Facility: CLINIC | Age: 22
End: 2022-04-22
Attending: OPTOMETRIST
Payer: COMMERCIAL

## 2022-04-22 DIAGNOSIS — H04.123 DRY EYES, BILATERAL: Primary | ICD-10-CM

## 2022-04-22 DIAGNOSIS — H35.413 BILATERAL RETINAL LATTICE DEGENERATION: ICD-10-CM

## 2022-04-22 PROCEDURE — G0463 HOSPITAL OUTPT CLINIC VISIT: HCPCS

## 2022-04-22 PROCEDURE — 92014 COMPRE OPH EXAM EST PT 1/>: CPT | Performed by: OPTOMETRIST

## 2022-04-22 PROCEDURE — 92134 CPTRZ OPH DX IMG PST SGM RTA: CPT | Performed by: OPTOMETRIST

## 2022-04-22 ASSESSMENT — REFRACTION_WEARINGRX
OS_CYLINDER: +0.50
OD_CYLINDER: +1.00
SPECS_TYPE: SVL
OS_SPHERE: -7.50
OD_SPHERE: -8.75
OS_AXIS: 055
OD_AXIS: 085

## 2022-04-22 ASSESSMENT — SLIT LAMP EXAM - LIDS
COMMENTS: NORMAL
COMMENTS: 1+ BLEPHARITIS, 1+ MEIBOMIAN GLAND DYSFUNCTION

## 2022-04-22 ASSESSMENT — TONOMETRY
OS_IOP_MMHG: 22
OD_IOP_MMHG: 16
IOP_METHOD: TONOPEN

## 2022-04-22 ASSESSMENT — CUP TO DISC RATIO
OS_RATIO: 0.35
OD_RATIO: 0.4

## 2022-04-22 ASSESSMENT — EXTERNAL EXAM - LEFT EYE: OS_EXAM: NORMAL

## 2022-04-22 ASSESSMENT — EXTERNAL EXAM - RIGHT EYE: OD_EXAM: NORMAL

## 2022-04-22 ASSESSMENT — CONF VISUAL FIELD
OD_NORMAL: 1
METHOD: COUNTING FINGERS
OS_NORMAL: 1

## 2022-04-22 ASSESSMENT — VISUAL ACUITY
METHOD: SNELLEN - LINEAR
OS_CC: 20/20
OD_CC: 20/20

## 2022-04-22 NOTE — PROGRESS NOTES
Assessment & Plan       Boy Carnes is a 22 year old male with the following diagnoses:   1. Dry eyes, bilateral - Both Eyes    2. Bilateral retinal lattice degeneration - Both Eyes          dry eyes drops PRN   Lattice degen and retinal thinning right eye peripapillary area unchanged compared to old photos   RTC 6 mths DFE-     Patient disposition:   No follow-ups on file.          Complete documentation of historical and exam elements from today's encounter can be found in the full encounter summary report (not reduplicated in this progress note). I personally obtained the chief complaint(s) and history of present illness.  I confirmed and edited as necessary the review of systems, past medical/surgical history, family history, social history, and examination findings as documented by others; and I examined the patient myself. I personally reviewed the relevant tests, images, and reports as documented above. I formulated and edited as necessary the assessment and plan and discussed the findings and management plan with the patient and family.  Dr. Bennie Clayton

## 2022-04-22 NOTE — NURSING NOTE
Chief Complaints and History of Present Illnesses   Patient presents with     Lattice Degeneration Follow Up     Chief Complaint(s) and History of Present Illness(es)     Lattice Degeneration Follow Up               Comments     Boy is here for follow of lattice degeneration BE. He was supposed to follow up wit retina but has not been seen with them since last visit here.   History of dry eyes BE. Today Boy states he had retina surgery about 6-7 months ago in [place] and wants to follow up here with a fundus exam.   His eyes are not very dry he states. He sees some floaters RE but denies flashes, or eye pain    Srikanth Berry COT 9:08 AM April 22, 2022

## 2022-04-22 NOTE — PATIENT INSTRUCTIONS
dry eyes drops PRN   Lattice degen and retinal thinning right eye peripapillary area unchanged compared to old photos   RTC 6 mths DFE-

## 2022-09-18 ENCOUNTER — HEALTH MAINTENANCE LETTER (OUTPATIENT)
Age: 22
End: 2022-09-18

## 2023-05-08 ENCOUNTER — HEALTH MAINTENANCE LETTER (OUTPATIENT)
Age: 23
End: 2023-05-08

## 2024-07-14 ENCOUNTER — HEALTH MAINTENANCE LETTER (OUTPATIENT)
Age: 24
End: 2024-07-14